# Patient Record
(demographics unavailable — no encounter records)

---

## 2025-04-16 NOTE — PHYSICAL EXAM
[FreeTextEntry3] : faint pink rough plaques on bilateral cheeks pink eczematous plaques scattered in neck folds

## 2025-04-16 NOTE — HISTORY OF PRESENT ILLNESS
[FreeTextEntry1] : F/u rash [de-identified] : Pt is a 8 month old M last seen 4/8/25, presenting for f/u of below:  # rash on face and body. started TAC 0.1% ointment BID to affected areas on body and mupirocin TID to crusted areas on face; bacterial cx was positive for S aureus. Since LV parents note significant improvement of face and hands, still with some roughness on neck. Not using topical rx's anymore. Moisturizing routinely with fragrance free ointment and creams

## 2025-04-16 NOTE — HISTORY OF PRESENT ILLNESS
[FreeTextEntry1] : F/u rash [de-identified] : Pt is a 8 month old M last seen 4/8/25, presenting for f/u of below:  # rash on face and body. started TAC 0.1% ointment BID to affected areas on body and mupirocin TID to crusted areas on face; bacterial cx was positive for S aureus. Since LV parents note significant improvement of face and hands, still with some roughness on neck. Not using topical rx's anymore. Moisturizing routinely with fragrance free ointment and creams

## 2025-04-16 NOTE — ASSESSMENT
[FreeTextEntry1] : #Atopic dermatitis, mild, chronic, flaring - Discussed chronic nature and course of condition, including treatment options risks/benefits involved and goals/expectations of therapy - For body: Continue Triamcinolone 0.1% ointment 1-2x per day PRN roughness on thin plaques  - For maintenance, start Eucrisa ointment BID to affected areas. SED including burning with application; avoid use to actively inflamed/red areas. - Discussed long term steroid use side effects such as skin atrophy, hypopigmentation and telangiectasis - Patient/parent agrees to use topical steroids sparingly and as prescribed - Dry skin care instructions/information reinforced with OTC product recommendations (ex: Cerave, Vaseline); creams and ointments recommended over lotions, avoid wipes - Take short baths with warm, not hot, water. Moisturize immediately after bathing, repeating as needed throughout the day.  # Impetigo, face, improved - S/p Mupirocin TID to affected areas with resolution - If crusting/pustules recur, restart mupirocin ointment TID to affected areas  RTC 2-3 mo, sooner PRN

## 2025-06-24 NOTE — CONSULT LETTER
[Dear  ___] : Dear  [unfilled], [Consult Letter:] : I had the pleasure of evaluating your patient, [unfilled]. [Please see my note below.] : Please see my note below. [Consult Closing:] : Thank you very much for allowing me to participate in the care of this patient.  If you have any questions, please do not hesitate to contact me. [Sincerely,] : Sincerely, [FreeTextEntry3] : Toi Martinez MD Pediatric Dermatology [facility]

## 2025-06-24 NOTE — PHYSICAL EXAM
[FreeTextEntry3] : pink rough plaques on cheeks with honey-colored crusting  few eczematous patches on the forearms

## 2025-06-24 NOTE — ASSESSMENT
[Use of independent historian: [ enter independent historian's relationship to patient ] :____] : As the patient was unable to provide a complete and reliable history, I obtained clinical history from the patient's [unfilled] [FreeTextEntry1] : #Atopic dermatitis, mild, chronic, flaring - Discussed chronic nature and course of condition, including treatment options risks/benefits involved and goals/expectations of therapy - For body: C/w TAC 0.1% ointment 1x per day PRN roughness on thin plaques, no more than two weeks at a time. Sent mometasone 0.1% if not controlled with TAC but dad opts to c/w TAC for now   - For maintenance, continue with Eucrisa ointment BID to affected areas. SED including burning with application; avoid use to actively inflamed/red areas. - Discussed long term steroid use side effects such as skin atrophy, hypopigmentation and telangiectasis - Patient/parent agrees to use topical steroids sparingly and as prescribed - Dry skin care instructions/information reinforced with OTC product recommendations (ex: Cerave, Vaseline); creams and ointments recommended over lotions, avoid wipes - Take short baths with warm, not hot, water. Moisturize immediately after bathing, repeating as needed throughout the day. - Advised plain coat of Vaseline prior to mealtime and at bedtime. Can also use thick coat of zinc oxide paste   # Impetigo, face History of staph infection; recurrent - Restart mupirocin ointment TID to affected areas until no longer crusted   RTC 2 mo, sooner PRN

## 2025-06-24 NOTE — HISTORY OF PRESENT ILLNESS
[FreeTextEntry1] : F/u rash [de-identified] : Pt is an 11 month old M last seen 4/15/2025, presenting for f/u of below: Here w/ mom and dad # Atopic derm - overall improved with topicals. Using triamcinolone for ~4 days once daily PRN flares, then switching to Eucrisa when skin is smooth. Also using thick layer of Vaseline prior to mealtime and at bedtime. Flaring on face today   S: Cerave baby M: Cerave cream D: All free and clear